# Patient Record
Sex: MALE | Race: WHITE
[De-identification: names, ages, dates, MRNs, and addresses within clinical notes are randomized per-mention and may not be internally consistent; named-entity substitution may affect disease eponyms.]

---

## 2019-12-09 ENCOUNTER — HOSPITAL ENCOUNTER (OUTPATIENT)
Dept: HOSPITAL 25 - OREAST | Age: 74
Discharge: HOME | End: 2019-12-09
Attending: OPHTHALMOLOGY
Payer: MEDICARE

## 2019-12-09 VITALS — SYSTOLIC BLOOD PRESSURE: 127 MMHG | DIASTOLIC BLOOD PRESSURE: 53 MMHG

## 2019-12-09 DIAGNOSIS — H25.812: Primary | ICD-10-CM

## 2019-12-09 DIAGNOSIS — E11.8: ICD-10-CM

## 2019-12-09 PROCEDURE — V2632 POST CHMBR INTRAOCULAR LENS: HCPCS

## 2019-12-09 NOTE — OP
DATE OF OPERATION:  12/09/19 [facility]

 

DATE OF BIRTH:  [birth year]

 

SURGEON:  Garo Caba MD.

 

ANESTHESIA:  Monitored anesthesia care.

 

PREOPERATIVE DIAGNOSIS:  Cataract, left eye.

 

POSTOPERATIVE DIAGNOSIS:  Cataract, left eye.

 

OPERATIVE PROCEDURE:  Extracapsular cataract extraction of the left eye with 
intraocular lens implant.

 

IMPLANT:  SN60WF 16.0 diopter lens to the left eye.

 

COMPLICATIONS:  None.

 

DESCRIPTION OF PROCEDURE:  The patient was given phenylephrine 2.5 % and 
cyclopentolate 1% eye drops to the operative eye in the preoperative area.  The 
patient was taken to the operating room where a time-out was taken to identify 
the correct patient, site, and side of surgery.  The patient's left eye was 
prepped and draped in the usual sterile fashion with 5% Betadine.  A second time
-out was taken to verify the correct patient, side, and site of surgery and 
correct lens implant. A lid speculum was placed to the left eye.  A 1-mm 
paracentesis blade was used to make a clear corneal incision in the 
inferotemporal position.  Preservative-free 1% lidocaine was injected into the 
anterior chamber.  DisCoVisc was then injected into the anterior chamber. A 2.75
-mm keratome blade was used to make a triplanar incision at the superotemporal 
position.  A cystotome initiated a capsulorrhexis, which was completed with 
Utrata forceps in a continuous and curvilinear manner. Hydrodissection of the 
lens was performed with BSS on a cannula.  The lens could be spun in the 
capsular bag.  The phacoemulsification handpiece was used with a divide- and-
conquer technique to remove the nucleus.  The I/A handpiece then removed the 
residual cortical lens material.  Provisc was injected to inflate the capsular 
bag. The ORA system was then used to confirm lens power.  The selected SN60WF 
16.0 diopter lens was injected into the capsular bag.  The residual DisCoVisc 
was removed from the eye with the I/A handpiece.  The corneal incisions were 
hydrated and no leaks occurred at physiologic pressure around 20 mmHg per 
palpation. The lid speculum was removed and drapes were removed.  Maxitrol 
ointment was placed to the surface of the operative eye.  An adhesive patch and 
shield were then placed on the operative eye.  The patient was taken to the 
postoperative area in stable condition.

 

 [id number]/CPS #: [id number]

MTDD